# Patient Record
(demographics unavailable — no encounter records)

---

## 2025-01-29 NOTE — DISCUSSION/SUMMARY
[de-identified] : GAVIN CAMILO is a 75 year old female presenting with 3-week history of left-sided shoulder pain status post mechanical fall resulting in rotator cuff tendinopathy and likely partial-thickness multifocal tear in the supra and infraspinatus seen on ultrasound.  Discussed risk benefits alternatives to conservative measures versus MRI and surgical consultation decision was made to trial conservative management.  Plan: 1.  Referral given to physical therapy 2.  Diclofenac 50 mg to be taken twice a day as needed prescribed 3.  Home exercise program provided 4.  Patient will follow-up in 2 months if no improvement can consider MRI and/or CSI at that time

## 2025-01-29 NOTE — PHYSICAL EXAM
[de-identified] : Shoulder (left)   Inspection  Skin: normal  Scapular winging: none   Palpation  Tenderness: Moderate Location: Supraspinatus footprint  ROM  Flexion-limited to to 90 degrees actively 120 passively Abduction -limited to 90 degrees actively 120 passively Rotation (internal) -limited to L5 Rotation (external) -limited by 5 degrees  Motor Strength  Flexion- 3+/5 within limited range of motion Abduction - 3+/5 Within limited range of motion  Rotation (internal) - 5/5  Rotation (external) - 4/5   Stability- normal  Sensory index- normal   Special Tests  Impingement-Andrew and Neer's positive Empty Can/Painful Arc-positive Speed test- normal  Liftoff- normal Obriens test-normal SAT/SRT-negative [de-identified] : Limited diagnostic ultrasound in office today of the: Left shoulder Indication: Pain  Findings/impression:   Focal partial tearing with hypoechoic areas throughout the supra and infraspinatus send fluid tracking into the subacromial space consistent with moderate multifocal partial tearing of the infraspinatus and supraspinatus.  XR of left shoulder Date: 1/29/2025   Views: 3 views Performed at [facility]: Yes Impression:   Degenerative changes noted at the greater tuberosity.  No significant glenohumeral osteoarthritis.  These images were personally reviewed with original findings documented as above.

## 2025-01-29 NOTE — HISTORY OF PRESENT ILLNESS
[de-identified] : GAVIN CAMILO is a 75 year old female presenting with left-sided shoulder pain for approximately 3 months.  Patient states she had a mechanical [place] on [place] sidewalk [place] and since then has had pain in the left shoulder and difficulty reaching the arm fully overhead.  She has not seen anyone for this up until this point.  She has been taking over-the-counter medicines with some relief.  She is here today for further evaluation.

## 2025-03-12 NOTE — PHYSICAL EXAM
[de-identified] : Shoulder (left)   Inspection  Skin: normal  Scapular winging: none   Palpation  Tenderness: Moderate Location: Supraspinatus footprint  ROM  Flexion-limited to to 90 degrees actively 120 passively Abduction -limited to 90 degrees actively 120 passively Rotation (internal) -limited to L5 Rotation (external) -limited by 5 degrees  Motor Strength  Flexion- 3+/5 within limited range of motion Abduction - 3+/5 Within limited range of motion  Rotation (internal) - 5/5  Rotation (external) - 4/5   Stability- normal  Sensory index- normal   Special Tests  Impingement-Andrew and Neer's positive Empty Can/Painful Arc-positive Speed test- normal  Liftoff- normal Obriens test-normal SAT/SRT-negative [de-identified] : Limited diagnostic ultrasound in office today of the: Left shoulder Indication: Pain  Findings/impression:   Focal partial tearing with hypoechoic areas throughout the supra and infraspinatus send fluid tracking into the subacromial space consistent with moderate multifocal partial tearing of the infraspinatus and supraspinatus.  XR of left shoulder Date: 1/29/2025   Views: 3 views Performed at [facility]: Yes Impression:   Degenerative changes noted at the greater tuberosity.  No significant glenohumeral osteoarthritis.  These images were personally reviewed with original findings documented as above.

## 2025-03-12 NOTE — DISCUSSION/SUMMARY
[de-identified] : GAVIN CAMILO is a 75 year old female presenting with 3-week history of left-sided shoulder pain status post mechanical fall resulting in rotator cuff tendinopathy and likely partial-thickness multifocal tear in the supra and infraspinatus seen on ultrasound.  Discussed risk benefits alternatives to conservative measures versus MRI and surgical consultation decision was made to trial conservative management.  Patient only able to do 3 sessions of physical therapy due to insurance issues since last visit.  Starting to make some progress.  Would like to continue conservative measures.  Agree with this plan.  Plan: 1.  Patient will continue physical therapy for an additional 5 to 6 weeks before reevaluation and decision for further evaluation and treatment 2.  She will continue diclofenac 50 mg twice a day as needed 3.  Patient will follow-up in 6 weeks after her dedicated PT sessions and if no improvement at that time can consider MRI and/or subacromial CSI.

## 2025-03-12 NOTE — HISTORY OF PRESENT ILLNESS
[de-identified] : GAVIN CAMILO is a 75 year old female presenting with left-sided shoulder pain for approximately 3 months.  Patient states she had a mechanical fall on [place] sidewalk [place] and since then has had pain in the left shoulder and difficulty reaching the arm fully overhead.  She has not seen anyone for this up until this point.  She has been taking over-the-counter medicines with some relief.  She is here today for further evaluation.  Interval history: Patient states she had issues with insurance and scheduling physical therapy and was only able to start 1 to 2 weeks ago and has done 3 sessions.  States she is now starting to see some improvement although mild.  Medication has been somewhat helpful.